# Patient Record
Sex: MALE | Race: WHITE | Employment: FULL TIME | URBAN - METROPOLITAN AREA
[De-identification: names, ages, dates, MRNs, and addresses within clinical notes are randomized per-mention and may not be internally consistent; named-entity substitution may affect disease eponyms.]

---

## 2023-01-01 ENCOUNTER — HOSPITAL ENCOUNTER (EMERGENCY)
Age: 38
Discharge: HOME | End: 2023-01-01

## 2023-01-01 VITALS
SYSTOLIC BLOOD PRESSURE: 137 MMHG | HEART RATE: 106 BPM | DIASTOLIC BLOOD PRESSURE: 97 MMHG | OXYGEN SATURATION: 96 % | RESPIRATION RATE: 16 BRPM | TEMPERATURE: 98 F

## 2023-01-01 DIAGNOSIS — L02.91 ABSCESS: Primary | ICD-10-CM

## 2023-01-01 PROCEDURE — 99283 EMERGENCY DEPT VISIT LOW MDM: CPT

## 2023-01-01 PROCEDURE — 6370000000 HC RX 637 (ALT 250 FOR IP): Performed by: NURSE PRACTITIONER

## 2023-01-01 RX ORDER — CEPHALEXIN 500 MG/1
500 CAPSULE ORAL ONCE
Status: COMPLETED | OUTPATIENT
Start: 2023-01-01 | End: 2023-01-01

## 2023-01-01 RX ORDER — MUPIROCIN CALCIUM 20 MG/G
CREAM TOPICAL
Qty: 30 G | Refills: 0 | Status: SHIPPED | OUTPATIENT
Start: 2023-01-01 | End: 2023-01-31

## 2023-01-01 RX ORDER — CEPHALEXIN 500 MG/1
500 CAPSULE ORAL 4 TIMES DAILY
Qty: 40 CAPSULE | Refills: 0 | Status: SHIPPED | OUTPATIENT
Start: 2023-01-01 | End: 2023-01-11

## 2023-01-01 RX ORDER — IBUPROFEN 800 MG/1
800 TABLET ORAL ONCE
Status: COMPLETED | OUTPATIENT
Start: 2023-01-01 | End: 2023-01-01

## 2023-01-01 RX ORDER — SULFAMETHOXAZOLE AND TRIMETHOPRIM 800; 160 MG/1; MG/1
2 TABLET ORAL 2 TIMES DAILY
Qty: 40 TABLET | Refills: 0 | Status: SHIPPED | OUTPATIENT
Start: 2023-01-01 | End: 2023-01-11

## 2023-01-01 RX ORDER — IBUPROFEN 800 MG/1
800 TABLET ORAL EVERY 8 HOURS PRN
Qty: 21 TABLET | Refills: 0 | Status: SHIPPED | OUTPATIENT
Start: 2023-01-01 | End: 2023-01-08

## 2023-01-01 RX ORDER — SULFAMETHOXAZOLE AND TRIMETHOPRIM 800; 160 MG/1; MG/1
1 TABLET ORAL ONCE
Status: COMPLETED | OUTPATIENT
Start: 2023-01-01 | End: 2023-01-01

## 2023-01-01 RX ADMIN — IBUPROFEN 800 MG: 800 TABLET, FILM COATED ORAL at 19:13

## 2023-01-01 RX ADMIN — SULFAMETHOXAZOLE AND TRIMETHOPRIM 1 TABLET: 800; 160 TABLET ORAL at 19:13

## 2023-01-01 RX ADMIN — MUPIROCIN: 20 OINTMENT TOPICAL at 19:36

## 2023-01-01 RX ADMIN — CEPHALEXIN 500 MG: 500 CAPSULE ORAL at 19:13

## 2023-01-01 ASSESSMENT — ENCOUNTER SYMPTOMS
ALLERGIC/IMMUNOLOGIC NEGATIVE: 1
COLOR CHANGE: 1
EYES NEGATIVE: 1

## 2023-01-02 NOTE — ED PROVIDER NOTES
[address]        Pt Name: Troy Jennings  MRN: [id number]  [place] 1985  Date of evaluation: 1/1/2023  Provider: DEXTER Plunkett CNP  PCP: No primary care provider on file. Note Started: 7:18 PM EST 1/1/23    CHIEF COMPLAINT       Chief Complaint   Patient presents with    Insect Bite       HISTORY OF PRESENT ILLNESS: 1 or more Elements   History From: Patient    Limitations to history : None    Troy Jennings is a 44 y.o. male who presents who presents to the emergency department with concerns for an insect bite with now concerning abscess formation to the left lower abdomen. Patient states that he sleeps in his truck, he is a  and states that he believes he was bit by a spider. He reports over 1 day history of noticing redness and does admit that he attempted to squeeze it and did get a small amount of purulent drainage but states after that the redness actually increased. He denies any fevers denies any chills there is no associated streaking. Patient also denies any other concerns including no noted chest pain no shortness of breath no abdominal pain as well as no noted back or flank pain and denies any nausea, vomiting or diarrhea. Patient reports being up-to-date on his tetanus immunization. Symptoms moderate in severity and persistent. Nursing Notes were all reviewed and agreed with or any disagreements were addressed in the HPI. REVIEW OF SYSTEMS :      Review of Systems   HENT: Negative. Eyes: Negative. Cardiovascular: Negative. Gastrointestinal:  Negative for abdominal pain. Endocrine: Negative. Genitourinary: Negative. Musculoskeletal: Negative. Negative for myalgias. Skin:  Positive for color change and wound. Patient with Left lower abd wall/lower anterior pelvic abscess with surrounding erythema.  No Active drainage , area firm, no streaking noted. No vesicle formation. Allergic/Immunologic: Negative. Neurological: Negative. Hematological: Negative. Psychiatric/Behavioral: Negative. Positives and Pertinent negatives as per HPI. SURGICAL HISTORY   History reviewed. No pertinent surgical history. [id number]       Discharge Medication List as of 1/1/2023  7:29 PM          ALLERGIES     Patient has no known allergies. FAMILYHISTORY     History reviewed. No pertinent family history. SOCIAL HISTORY       Social History     Tobacco Use    Smoking status: Every Day     Types: Cigarettes    Smokeless tobacco: Never   Vaping Use    Vaping Use: Never used   Substance Use Topics    Drug use: Never       SCREENINGS        [place] Coma Scale  Eye Opening: Spontaneous  Best Verbal Response: Oriented  Best Motor Response: Obeys commands  [place] Coma Scale Score: 15                CIWA Assessment  BP: (!) 137/97  Heart Rate: (!) 106           PHYSICAL EXAM  1 or more Elements     ED Triage Vitals   BP Temp Temp Source Heart Rate Resp SpO2 Height Weight   01/01/23 1907 01/01/23 1856 01/01/23 1856 01/01/23 1856 01/01/23 1907 01/01/23 1856 -- --   (!) 137/97 98 °F (36.7 °C) Oral (!) 106 16 96 %         Physical Exam  Constitutional:       Appearance: Normal appearance. HENT:      Head: Atraumatic. Nose: Nose normal.      Mouth/Throat:      Mouth: Mucous membranes are moist.      Pharynx: Oropharynx is clear. Eyes:      Extraocular Movements: Extraocular movements intact. Conjunctiva/sclera: Conjunctivae normal.      Pupils: Pupils are equal, round, and reactive to light. Cardiovascular:      Rate and Rhythm: Normal rate and regular rhythm. Pulmonary:      Effort: Pulmonary effort is normal. No respiratory distress. Breath sounds: Normal breath sounds. No stridor. No wheezing, rhonchi or rales.    Abdominal:      General: Bowel sounds are normal.   Musculoskeletal:         General: Normal range of motion. Cervical back: Normal range of motion. Skin:     General: Skin is warm and dry. Findings: Erythema present. Comments: Patient with Left lower abd wall/lower anterior pelvic abscess with surrounding erythema. No Active drainage , area firm, no streaking noted. No vesicle formation. No indication for incision and drainage procedure at this time   Neurological:      General: No focal deficit present. Mental Status: He is alert and oriented to person, place, and time. Psychiatric:         Mood and Affect: Mood normal.                     DIAGNOSTIC RESULTS   LABS:    Labs Reviewed - No data to display    As interpreted by me, the above displayed labs are abnormal. All other labs obtained during this visit were within normal range or not returned as of this dictation. RADIOLOGY:   Non-plain film images such as CT, Ultrasound and MRI are read by the radiologist. Plain radiographic images are visualized and preliminarily interpreted by the ED Provider with the below findings:        Interpretation per the Radiologist below, if available at the time of this note:    No orders to display     No results found. No results found. PROCEDURES   Unless otherwise noted below, none     Procedures    CRITICAL CARE TIME (.cct)       PAST MEDICAL HISTORY/Chronic Conditions Affecting Care      has no past medical history on file.      EMERGENCY DEPARTMENT COURSE    Vitals:    Vitals:    01/01/23 1856 01/01/23 1907   BP:  (!) 137/97   Pulse: (!) 106    Resp:  16   Temp: 98 °F (36.7 °C)    TempSrc: Oral    SpO2: 96%        Patient was given the following medications:  Medications   mupirocin (BACTROBAN) 2 % ointment ( Topical Given 1/1/23 1936)   sulfamethoxazole-trimethoprim (BACTRIM DS;SEPTRA DS) 800-160 MG per tablet 1 tablet (1 tablet Oral Given 1/1/23 1913)   cephALEXin (KEFLEX) capsule 500 mg (500 mg Oral Given 1/1/23 1913)   ibuprofen (ADVIL;MOTRIN) tablet 800 mg (800 mg Oral Given 1/1/23 1913)             Is this patient to be included in the SEP-1 Core Measure due to severe sepsis or septic shock? No Exclusion criteria - the patient is NOT to be included for SEP-1 Core Measure due to: Infection is not suspected        Medical Decision Making/Differential Diagnosis:    CC/HPI Summary, Social Determinants of health, Records Reviewed, DDx, testing done/not done, ED Course, Reassessment, disposition considerations/shared decision making with patient, consults, disposition:    Patience Almonte is a 44 y.o. male who presents who presents to the emergency department with concerns for an insect bite with now concerning abscess formation to the left lower abdomen. Patient states that he sleeps in his truck, he is a  and states that he believes he was bit by a spider. He reports over 1 day history of noticing redness and does admit that he attempted to squeeze it and did get a small amount of purulent drainage but states after that the redness actually increased. He denies any fevers denies any chills there is no associated streaking. Patient also denies any other concerns including no noted chest pain no shortness of breath no abdominal pain as well as no noted back or flank pain and denies any nausea, vomiting or diarrhea. Patient reports being up-to-date on his tetanus immunization. Symptoms moderate in severity and persistent. Differential includes abscess versus cellulitis versus sepsis. Patient does have concerns for abscess but at this time there is no indication for incision and drainage. I did speak with patient and he was reevaluated. Patient will be safe for discharge home and made aware to follow-up with his primary care doctor when he does return.   Patient was provided with Bactrim DS 1 tablet orally by mouth as well as Keflex 500 mg 1 tablet orally by mouth as well as Motrin 800 mg 1 tablet orally by mouth as well as having Bactroban ointment placed onto abscess site. Patient was made aware of good follow-up care as well as supportive measures at home as well as not touching or squeezing at site and the importance of when to return. Patient was also educated on medications that he will be discharged home with. Patient neurovascular and hemodynamically intact. Patient does not have any social concerns he appears to be a very reliable source and I did review all records and patient will be safely discharged home. Patient is agreeable with this plan. I am the Primary Clinician of Record. FINAL IMPRESSION      1. Abscess          DISPOSITION/PLAN     DISPOSITION Decision To Discharge 01/01/2023 07:10:37 PM      PATIENT REFERRED TO:  Primary care doctor  Wound recheck with us in 3 to 5 days        Worker's Comp. physician  For continued medical management and follow-up within 1 week          DISCHARGE MEDICATIONS:  Discharge Medication List as of 1/1/2023  7:29 PM        START taking these medications    Details   sulfamethoxazole-trimethoprim (BACTRIM DS) 800-160 MG per tablet Take 2 tablets by mouth 2 times daily for 10 days, Disp-40 tablet, R-0Print      cephALEXin (KEFLEX) 500 MG capsule Take 1 capsule by mouth 4 times daily for 10 days, Disp-40 capsule, R-0Print      mupirocin (BACTROBAN) 2 % cream Apply topically 3 times daily. , Disp-30 g, R-0, Print      ibuprofen (IBU) 800 MG tablet Take 1 tablet by mouth every 8 hours as needed for Pain, Disp-21 tablet, R-0Print             DISCONTINUED MEDICATIONS:  Discharge Medication List as of 1/1/2023  7:29 PM                 (Please note that portions of this note were completed with a voice recognition program.  Efforts were made to edit the dictations but occasionally words are mis-transcribed.)    DEXTER Marin CNP (electronically signed)          DEXTER Marin CNP  01/06/23 0113

## 2023-01-02 NOTE — DISCHARGE INSTRUCTIONS
Take antibiotics as prescribed, apply the Bactroban ointment as well. Do not touch or squeeze area. Perform warm soaks to site  If area increases in size or gets larger please return to another emergency department for incision and drainage.

## 2023-01-06 ASSESSMENT — ENCOUNTER SYMPTOMS: ABDOMINAL PAIN: 0
